# Patient Record
Sex: FEMALE | Race: ASIAN | NOT HISPANIC OR LATINO | Employment: PART TIME | URBAN - METROPOLITAN AREA
[De-identification: names, ages, dates, MRNs, and addresses within clinical notes are randomized per-mention and may not be internally consistent; named-entity substitution may affect disease eponyms.]

---

## 2022-06-24 ENCOUNTER — OFFICE VISIT (OUTPATIENT)
Dept: OPHTHALMOLOGY | Facility: CLINIC | Age: 22
End: 2022-06-24
Payer: COMMERCIAL

## 2022-06-24 VITALS — BODY MASS INDEX: 20.24 KG/M2 | WEIGHT: 110 LBS | HEIGHT: 62 IN

## 2022-06-24 DIAGNOSIS — H52.13 MYOPIA OF BOTH EYES: Primary | ICD-10-CM

## 2022-06-24 PROCEDURE — 92002 INTRM OPH EXAM NEW PATIENT: CPT | Performed by: OPTOMETRIST

## 2022-06-24 PROCEDURE — 92310 CONTACT LENS FITTING OU: CPT | Performed by: OPTOMETRIST

## 2022-06-24 ASSESSMENT — VISUAL ACUITY
OD_CC: J1+
OS_CC+: -1
METHOD: SNELLEN - LINEAR
OS_CC: 20/20
CORRECTION_TYPE: GLASSES
OS_CC: J1+
OD_CC+: -1
OD_CC: 20/20

## 2022-06-24 ASSESSMENT — REFRACTION_MANIFEST
OS_CYLINDER: +0.50
OS_AXIS: 030
OD_SPHERE: -6.00
OS_SPHERE: -6.50
OD_CYLINDER: +0.50
OD_AXIS: 092

## 2022-06-24 ASSESSMENT — KERATOMETRY
OS_AXISANGLE_DEGREES: 83
OD_AXISANGLE2_DEGREES: 180
OS_AXISANGLE2_DEGREES: 173
OD_K1POWER_DIOPTERS: 72.50
OD_AXISANGLE_DEGREES: 90
OS_K1POWER_DIOPTERS: 42.25
OD_K2POWER_DIOPTERS: 43.25
OS_K2POWER_DIOPTERS: 42.75

## 2022-06-24 ASSESSMENT — CUP TO DISC RATIO
OD_RATIO: 0.40
OS_RATIO: 0.40

## 2022-06-24 ASSESSMENT — SLIT LAMP EXAM - LIDS
COMMENTS: NORMAL
COMMENTS: NORMAL

## 2022-06-24 ASSESSMENT — REFRACTION_CURRENTRX
OS_BASECURVE: 8.4
OS_DIAMETER: 14.0
OS_BRAND: ACUVUE OASYS
OD_DIAMETER: 14.0
OD_BRAND: ACUVUE OASYS
OD_BASECURVE: 8.4
OS_SPHERE: -6.00
OD_SPHERE: -5.50

## 2022-06-24 ASSESSMENT — REFRACTION_WEARINGRX
OD_SPHERE: -5.75
OS_SPHERE: -6.75
SPECS_TYPE: SVL
OD_CYLINDER: +0.50
OD_AXIS: 092
OS_CYLINDER: SPHERE

## 2022-06-24 ASSESSMENT — CONF VISUAL FIELD
METHOD: COUNTING FINGERS
OD_NORMAL: 1
OS_NORMAL: 1

## 2022-06-24 ASSESSMENT — TONOMETRY
OS_IOP_MMHG: 16
OD_IOP_MMHG: 16
IOP_METHOD: ICARE

## 2022-06-24 NOTE — PROGRESS NOTES
A/P  1.) Myopia OU  -Doing well in current glasses  -Interested in contact lens wear, has never worn before  -Unsuccessful I&R today, pt unable to insert  -I was only able to insert lenses on her after proparacaine OU. Prior to that lids were squeezing too much and poor fixation/looking away  -Reviewed she needs practice touching lids/getting comfortable with fingers near the eyes. Rec practicing with eyedrops and fingers only watching insertion videos  -Did not dispense Rx today given unsuccessful I&R. She would like to return for a tech visit for further practice. If she is successfully able to independently insert and remove we can dispense the Rx, but not before then    F/u next week tech visit    I have confirmed the patient's CC, HPI and reviewed Past Medical History, Past Surgical History, Social History, Family History, Problem List, Medication List and agree with Tech note.     BRYAN AlemanO PAYTONS

## 2022-06-24 NOTE — NURSING NOTE
Chief Complaints and History of Present Illnesses   Patient presents with     Contact Lens Evaluation     Exam for contacts.     Chief Complaint(s) and History of Present Illness(es)     Contact Lens Evaluation     Laterality: both eyes    Associated symptoms: eye pain.  Negative for dryness, tearing and discharge    Treatments tried: no treatments    Pain scale: 0/10    Comments: Exam for contacts.              Comments     Patient reports that she had an eye exam earlier this year and got new glasses after that exam. Happy with vision in glasses Rx of 4 + months.   Seeing fine distance and near with them each eye.   Has not used contact in the past.    Brisa Rich, COT COT 9:07 AM June 24, 2022           Brisa Hilario, COT COT 9:07 AM June 24, 2022

## 2022-07-01 ENCOUNTER — ALLIED HEALTH/NURSE VISIT (OUTPATIENT)
Dept: OPHTHALMOLOGY | Facility: CLINIC | Age: 22
End: 2022-07-01
Payer: COMMERCIAL

## 2022-07-01 DIAGNOSIS — H52.13 MYOPIA OF BOTH EYES: Primary | ICD-10-CM

## 2022-07-01 PROCEDURE — 99207 PR NO BILLABLE SERVICE THIS VISIT: CPT

## 2022-07-01 NOTE — Clinical Note
Patient will plan to call for another follow up I and R still not quite ready to use. Brisa Rich, GEOVANNI COT 12:32 PM July 1, 2022